# Patient Record
Sex: MALE | Race: WHITE | NOT HISPANIC OR LATINO
[De-identification: names, ages, dates, MRNs, and addresses within clinical notes are randomized per-mention and may not be internally consistent; named-entity substitution may affect disease eponyms.]

---

## 2019-10-01 ENCOUNTER — APPOINTMENT (OUTPATIENT)
Dept: ORTHOPEDIC SURGERY | Facility: CLINIC | Age: 44
End: 2019-10-01
Payer: COMMERCIAL

## 2019-10-01 VITALS
BODY MASS INDEX: 28.44 KG/M2 | HEIGHT: 72 IN | DIASTOLIC BLOOD PRESSURE: 86 MMHG | SYSTOLIC BLOOD PRESSURE: 129 MMHG | HEART RATE: 64 BPM | WEIGHT: 210 LBS

## 2019-10-01 DIAGNOSIS — S69.81XA OTHER SPECIFIED INJURIES OF RIGHT WRIST, HAND AND FINGER(S), INITIAL ENCOUNTER: ICD-10-CM

## 2019-10-01 DIAGNOSIS — Z78.9 OTHER SPECIFIED HEALTH STATUS: ICD-10-CM

## 2019-10-01 DIAGNOSIS — Z80.51 FAMILY HISTORY OF MALIGNANT NEOPLASM OF KIDNEY: ICD-10-CM

## 2019-10-01 DIAGNOSIS — Z72.3 LACK OF PHYSICAL EXERCISE: ICD-10-CM

## 2019-10-01 PROCEDURE — 99204 OFFICE O/P NEW MOD 45 MIN: CPT

## 2019-10-01 PROCEDURE — 73110 X-RAY EXAM OF WRIST: CPT | Mod: RT
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
## 2019-10-01 NOTE — PHYSICAL EXAM
[de-identified] : Constitutional: Well-nourished, well-developed, No acute distress\par Respiratory:  Good respiratory effort, no SOB\par Lymphatic: No regional lymphadenopathy, no lymphedema\par Psychiatric: Pleasant and normal affect, alert and oriented x3\par Skin: Clean dry and intact B/L UE/LE\par Musculoskeletal: normal except where as noted in regional exam\par \par \par Left Wrist:\par APPEARANCE: no marked deformities, no swelling or malalignment\par POSITIVE TENDERNESS: none\par NONTENDER: snuffbox, scapholunate, DRUJ, TFCC, FCU/FCR, ECU/ECRL/ECRB, radial/ulnar styloid, CMC, and MCP joints. \par ROM: full & painless. \par RESISTIVE TESTING: painless resisted wrist flexion/extension, and radial/ulnar deviation. \par SPECIAL TESTS: neg Finkelstein's. neg Phalen's. neg reverse Phalen's. neg Coyle's. neg rosalind test. neg TFCC grind. neg tinel's at the carpal tunnel\par Vasc: 2+ radial pulse\par Neuro: AIN, PIN, Ulnar nerve intact to motor\par Sensation: Intact to light touch throughout\par B/L Shoulders:  No asymmetry, malalignment, or swelling, Full ROM, 5/5 strength in flexion/ext, Abd/Add, IR/ER, Joints stable\par B/L Elbows:  No asymmetry, malalignment, or swelling, Full ROM, 5/5 strength in flex/ext, pronation/supination, Joints stable\par \par Right Wrist:\par APPEARANCE: No swelling, no marked deformities or malalignment\par POSITIVE TENDERNESS: TFCC, ECU\par NONTENDER: snuffbox, scapholunate, DRUJ, FCU/FCR, ECRL/ECRB, radial/ulnar styloid, CMC, and MCP joints.\par ROM: full & painless. \par RESISTIVE TESTING: Mild pain with resisted wrist extension and radial/ulnar deviation, painless resisted wrist flexion. \par SPECIAL TESTS: + TFCC grind, + shuck test, neg Finkelstein's. neg Phalen's. neg reverse Phalen's. neg Coyle's. neg tinel's at the carpal tunnel\par Vasc: 2+ radial pulse\par Neuro: AIN, PIN, Ulnar nerve intact to motor\par Sensation: Intact to light touch throughout\par \par  [de-identified] : The following radiographs were ordered and read by me during this patient's visit. I reviewed each radiograph in detail with the patient and discussed the findings as highlighted below. \par \par 3 views of the right wrist were obtained today that show no fracture, or dislocation. There are no degenerative changes seen. There is no malalignment. No obvious osseous abnormality. Otherwise unremarkable.\par \par

## 2019-10-01 NOTE — DISCUSSION/SUMMARY
[de-identified] : Discussed findings of today's exam and possible causes of patient's pain.  Educated patient on their most probable diagnosis of right TFCC sprain.  Reviewed possible courses of treatment, and we collaboratively decided best course of treatment at this time will include conservative management. Patient advised she has no evidence of tendon injury or damage on clinical exam. His pain is localized to the illness of the wrist and the TFCC. It is only reproduced with a rapid activity and weightbearing activity, no significant pain during clinical exam. We discussed various treatment options, recommend trial although the counter topical anti-inflammatory. Recommend continued watchful waiting, patient is not having limitations with ADLs, only has mild intermittent pain. May consider cortisone injection if he has persistent pain, may consider physical therapy.  Follow up as needed.  Patient appreciates and agrees with current plan.\par \par This note was generated using dragon medical dictation software.  A reasonable effort has been made for proofreading its contents, but typos may still remain.  If there are any questions or points of clarification needed please notify my office.

## 2021-10-10 ENCOUNTER — TRANSCRIPTION ENCOUNTER (OUTPATIENT)
Age: 46
End: 2021-10-10

## 2021-10-12 ENCOUNTER — TRANSCRIPTION ENCOUNTER (OUTPATIENT)
Age: 46
End: 2021-10-12

## 2021-10-19 ENCOUNTER — FORM ENCOUNTER (OUTPATIENT)
Age: 46
End: 2021-10-19

## 2021-10-20 ENCOUNTER — INPATIENT (INPATIENT)
Facility: HOSPITAL | Age: 46
LOS: 0 days | Discharge: HOME | DRG: 177 | End: 2021-10-21
Attending: HOSPITALIST | Admitting: HOSPITALIST
Payer: COMMERCIAL

## 2021-10-20 ENCOUNTER — TRANSCRIPTION ENCOUNTER (OUTPATIENT)
Age: 46
End: 2021-10-20

## 2021-10-20 VITALS
SYSTOLIC BLOOD PRESSURE: 133 MMHG | DIASTOLIC BLOOD PRESSURE: 83 MMHG | RESPIRATION RATE: 16 BRPM | TEMPERATURE: 103 F | HEART RATE: 90 BPM | OXYGEN SATURATION: 91 %

## 2021-10-20 DIAGNOSIS — U07.1 COVID-19: ICD-10-CM

## 2021-10-20 DIAGNOSIS — F17.210 NICOTINE DEPENDENCE, CIGARETTES, UNCOMPLICATED: ICD-10-CM

## 2021-10-20 DIAGNOSIS — J12.82 PNEUMONIA DUE TO CORONAVIRUS DISEASE 2019: ICD-10-CM

## 2021-10-20 LAB
ALBUMIN SERPL ELPH-MCNC: 4 G/DL — SIGNIFICANT CHANGE UP (ref 3.3–5)
ALP SERPL-CCNC: 54 U/L — SIGNIFICANT CHANGE UP (ref 40–120)
ALT FLD-CCNC: 27 U/L — SIGNIFICANT CHANGE UP (ref 12–78)
ANION GAP SERPL CALC-SCNC: 5 MMOL/L — SIGNIFICANT CHANGE UP (ref 5–17)
AST SERPL-CCNC: 19 U/L — SIGNIFICANT CHANGE UP (ref 15–37)
BASOPHILS # BLD AUTO: 0.02 K/UL — SIGNIFICANT CHANGE UP (ref 0–0.2)
BASOPHILS NFR BLD AUTO: 0.3 % — SIGNIFICANT CHANGE UP (ref 0–2)
BILIRUB SERPL-MCNC: 0.6 MG/DL — SIGNIFICANT CHANGE UP (ref 0.2–1.2)
BUN SERPL-MCNC: 13 MG/DL — SIGNIFICANT CHANGE UP (ref 7–23)
CALCIUM SERPL-MCNC: 9.6 MG/DL — SIGNIFICANT CHANGE UP (ref 8.5–10.1)
CHLORIDE SERPL-SCNC: 104 MMOL/L — SIGNIFICANT CHANGE UP (ref 96–108)
CO2 SERPL-SCNC: 27 MMOL/L — SIGNIFICANT CHANGE UP (ref 22–31)
CREAT SERPL-MCNC: 1.17 MG/DL — SIGNIFICANT CHANGE UP (ref 0.5–1.3)
D DIMER BLD IA.RAPID-MCNC: <150 NG/ML DDU — SIGNIFICANT CHANGE UP
EOSINOPHIL # BLD AUTO: 0.01 K/UL — SIGNIFICANT CHANGE UP (ref 0–0.5)
EOSINOPHIL NFR BLD AUTO: 0.2 % — SIGNIFICANT CHANGE UP (ref 0–6)
FERRITIN SERPL-MCNC: 635 NG/ML — HIGH (ref 30–400)
GLUCOSE SERPL-MCNC: 93 MG/DL — SIGNIFICANT CHANGE UP (ref 70–99)
HCT VFR BLD CALC: 46.1 % — SIGNIFICANT CHANGE UP (ref 39–50)
HGB BLD-MCNC: 15.2 G/DL — SIGNIFICANT CHANGE UP (ref 13–17)
IMM GRANULOCYTES NFR BLD AUTO: 0.5 % — SIGNIFICANT CHANGE UP (ref 0–1.5)
LYMPHOCYTES # BLD AUTO: 0.84 K/UL — LOW (ref 1–3.3)
LYMPHOCYTES # BLD AUTO: 12.6 % — LOW (ref 13–44)
MCHC RBC-ENTMCNC: 28.7 PG — SIGNIFICANT CHANGE UP (ref 27–34)
MCHC RBC-ENTMCNC: 33 GM/DL — SIGNIFICANT CHANGE UP (ref 32–36)
MCV RBC AUTO: 87.1 FL — SIGNIFICANT CHANGE UP (ref 80–100)
MONOCYTES # BLD AUTO: 0.63 K/UL — SIGNIFICANT CHANGE UP (ref 0–0.9)
MONOCYTES NFR BLD AUTO: 9.5 % — SIGNIFICANT CHANGE UP (ref 2–14)
NEUTROPHILS # BLD AUTO: 5.12 K/UL — SIGNIFICANT CHANGE UP (ref 1.8–7.4)
NEUTROPHILS NFR BLD AUTO: 76.9 % — SIGNIFICANT CHANGE UP (ref 43–77)
PLATELET # BLD AUTO: 203 K/UL — SIGNIFICANT CHANGE UP (ref 150–400)
POTASSIUM SERPL-MCNC: 4.2 MMOL/L — SIGNIFICANT CHANGE UP (ref 3.5–5.3)
POTASSIUM SERPL-SCNC: 4.2 MMOL/L — SIGNIFICANT CHANGE UP (ref 3.5–5.3)
PROCALCITONIN SERPL-MCNC: 0.05 NG/ML — SIGNIFICANT CHANGE UP (ref 0.02–0.1)
PROT SERPL-MCNC: 7.8 GM/DL — SIGNIFICANT CHANGE UP (ref 6–8.3)
RBC # BLD: 5.29 M/UL — SIGNIFICANT CHANGE UP (ref 4.2–5.8)
RBC # FLD: 13 % — SIGNIFICANT CHANGE UP (ref 10.3–14.5)
SODIUM SERPL-SCNC: 136 MMOL/L — SIGNIFICANT CHANGE UP (ref 135–145)
WBC # BLD: 6.65 K/UL — SIGNIFICANT CHANGE UP (ref 3.8–10.5)
WBC # FLD AUTO: 6.65 K/UL — SIGNIFICANT CHANGE UP (ref 3.8–10.5)

## 2021-10-20 PROCEDURE — 99285 EMERGENCY DEPT VISIT HI MDM: CPT

## 2021-10-20 PROCEDURE — C9399: CPT

## 2021-10-20 PROCEDURE — 71045 X-RAY EXAM CHEST 1 VIEW: CPT | Mod: 26

## 2021-10-20 PROCEDURE — 82565 ASSAY OF CREATININE: CPT

## 2021-10-20 PROCEDURE — 83036 HEMOGLOBIN GLYCOSYLATED A1C: CPT

## 2021-10-20 PROCEDURE — 93010 ELECTROCARDIOGRAM REPORT: CPT

## 2021-10-20 PROCEDURE — 80076 HEPATIC FUNCTION PANEL: CPT

## 2021-10-20 PROCEDURE — 36415 COLL VENOUS BLD VENIPUNCTURE: CPT

## 2021-10-20 PROCEDURE — 86769 SARS-COV-2 COVID-19 ANTIBODY: CPT

## 2021-10-20 PROCEDURE — 99223 1ST HOSP IP/OBS HIGH 75: CPT

## 2021-10-20 RX ORDER — ACETAMINOPHEN 500 MG
1000 TABLET ORAL ONCE
Refills: 0 | Status: COMPLETED | OUTPATIENT
Start: 2021-10-20 | End: 2021-10-20

## 2021-10-20 RX ORDER — REMDESIVIR 5 MG/ML
200 INJECTION INTRAVENOUS EVERY 24 HOURS
Refills: 0 | Status: COMPLETED | OUTPATIENT
Start: 2021-10-20 | End: 2021-10-20

## 2021-10-20 RX ORDER — ENOXAPARIN SODIUM 100 MG/ML
40 INJECTION SUBCUTANEOUS EVERY 12 HOURS
Refills: 0 | Status: DISCONTINUED | OUTPATIENT
Start: 2021-10-20 | End: 2021-10-21

## 2021-10-20 RX ORDER — ALBUTEROL 90 UG/1
2 AEROSOL, METERED ORAL EVERY 4 HOURS
Refills: 0 | Status: DISCONTINUED | OUTPATIENT
Start: 2021-10-20 | End: 2021-10-21

## 2021-10-20 RX ORDER — DEXAMETHASONE 0.5 MG/5ML
6 ELIXIR ORAL ONCE
Refills: 0 | Status: COMPLETED | OUTPATIENT
Start: 2021-10-20 | End: 2021-10-20

## 2021-10-20 RX ORDER — INFLUENZA VIRUS VACCINE 15; 15; 15; 15 UG/.5ML; UG/.5ML; UG/.5ML; UG/.5ML
0.5 SUSPENSION INTRAMUSCULAR ONCE
Refills: 0 | Status: DISCONTINUED | OUTPATIENT
Start: 2021-10-20 | End: 2021-10-21

## 2021-10-20 RX ORDER — ACETAMINOPHEN 500 MG
650 TABLET ORAL EVERY 4 HOURS
Refills: 0 | Status: DISCONTINUED | OUTPATIENT
Start: 2021-10-20 | End: 2021-10-21

## 2021-10-20 RX ORDER — REMDESIVIR 5 MG/ML
INJECTION INTRAVENOUS
Refills: 0 | Status: DISCONTINUED | OUTPATIENT
Start: 2021-10-20 | End: 2021-10-21

## 2021-10-20 RX ORDER — BNT162B2 0.23 MG/2.25ML
0.3 INJECTION, SUSPENSION INTRAMUSCULAR
Qty: 0 | Refills: 0 | DISCHARGE

## 2021-10-20 RX ORDER — ENOXAPARIN SODIUM 100 MG/ML
100 INJECTION SUBCUTANEOUS EVERY 12 HOURS
Refills: 0 | Status: DISCONTINUED | OUTPATIENT
Start: 2021-10-20 | End: 2021-10-20

## 2021-10-20 RX ORDER — SODIUM CHLORIDE 9 MG/ML
250 INJECTION INTRAMUSCULAR; INTRAVENOUS; SUBCUTANEOUS
Refills: 0 | Status: COMPLETED | OUTPATIENT
Start: 2021-10-20 | End: 2021-10-20

## 2021-10-20 RX ORDER — DEXAMETHASONE 0.5 MG/5ML
6 ELIXIR ORAL DAILY
Refills: 0 | Status: DISCONTINUED | OUTPATIENT
Start: 2021-10-21 | End: 2021-10-21

## 2021-10-20 RX ORDER — REMDESIVIR 5 MG/ML
100 INJECTION INTRAVENOUS EVERY 24 HOURS
Refills: 0 | Status: DISCONTINUED | OUTPATIENT
Start: 2021-10-21 | End: 2021-10-21

## 2021-10-20 RX ADMIN — Medication 6 MILLIGRAM(S): at 14:07

## 2021-10-20 RX ADMIN — ENOXAPARIN SODIUM 40 MILLIGRAM(S): 100 INJECTION SUBCUTANEOUS at 21:10

## 2021-10-20 RX ADMIN — REMDESIVIR 500 MILLIGRAM(S): 5 INJECTION INTRAVENOUS at 21:10

## 2021-10-20 RX ADMIN — Medication 1000 MILLIGRAM(S): at 14:07

## 2021-10-20 NOTE — ED PROVIDER NOTE - ATTENDING CONTRIBUTION TO CARE
I, Harman Barraza MD, personally saw the patient with the PA, and completed the key components of the history and physical exam. I then discussed the management plan with the PA.

## 2021-10-20 NOTE — ED ADULT NURSE REASSESSMENT NOTE - NS ED NURSE REASSESS COMMENT FT1
Vital signs obtained pre monoclonal anitbody infusion. O2 sat 91% and unable to receive monoclonal antibody infusion at this time due to hypoxia. Temperature oral 102.5. HERON Yao is aware of O2 sat 91% RA and elevated temperature of 102.5. IV placed in right hand 20 G, Pt tolerated well, positive blood return, flushed with 0.9% normal saline. Pt being transferred in hospital to be evaluated for hypoxia and covid 19 detected.
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
## 2021-10-20 NOTE — ED PROVIDER NOTE - PROGRESS NOTE DETAILS
44 yo male smoker presents with fever, myalgia, cough. Pt states was taking tylenol and nsaids which did not help with the fever and had a +covid test 8 days ago. Pt was scheduled for MAB and was added to the schedule. At rest, O2 sat was 93% and on exertion with pt marching in place and walking around the room dropped down to 90% and disqualified him from MAB infusion and sent to the main ER for evaluation. -Ricardo Hoff PA-C

## 2021-10-20 NOTE — ED PROVIDER NOTE - CONSTITUTIONAL, MLM
White male adult, alert, no sentence shortening, no respiratory distress, mildly ill, non toxic. normal...

## 2021-10-20 NOTE — ED PROVIDER NOTE - OBJECTIVE STATEMENT
46 y/o male with no pertinent PMHx, ambulatory to the ED with active COVID sx with generalized weakness, fatigue, slight dry cough, diffuse myalgias, mild decrease appetite, decrease smell sensation. Fever up to 102F, poorly responsive to oral antipyretic. Sx started last week due to wife being COVID + and then their 10 y/o was + and then pt was found to be COVID +. Pt came to the ED for MAB, but was found to be hypoxic in the low 90s. +Pfizer vaccinated x2, 2nd in April. Pt denies SOB, HA, rash, CP. No current PCP. NKDA. 44 y/o male with no pertinent PMHx, ambulatory to the ED with active COVID sx with generalized weakness, fatigue, slight dry cough, diffuse myalgias, mild decrease appetite, decrease smell sensation. Fever up to 102F, poorly responsive to oral antipyretic. Sx started last week due to wife being COVID + and then their child was + and then pt was found to be COVID +. Pt came to the ED for MAB, but was found to be hypoxic in the low 90s. +Pfizer vaccinated x2, 2nd in April. Pt denies SOB, HA, rash, CP. No current PCP. NKDA.

## 2021-10-20 NOTE — H&P ADULT - HISTORY OF PRESENT ILLNESS
46 yo M with no pmh presents for MAB after he was found to be covid positive. Patient endorses several days of generalized weakness, sob, dry cough, fatigue, myalgias and decreased sense of smell. Endorses fevers to 102 that were not defervescing with meds.  Pt endorses he is double Pfizer vaccinated. Wife and son were found to be positive prior to his symptoms.   In ED he was found to hypoxic to 90% on RA. CXR with L perihilar infiltrates. Dimer negative    Given decdaron and tylenol    PAST MEDICAL/SURGICAL/FAMILY/SOCIAL HISTORY:    Past Medical, Past Surgical, and Family History:  PAST MEDICAL HISTORY:  No pertinent past medical history.     Tobacco Usage:  · Tobacco Usage	Current some day smoker  Shx/Fhx: none 44 yo M with no pmh presents for MAB after he was found to be covid positive on 10/12. Patient endorses several days of generalized weakness, sob, dry cough, fatigue, myalgias and decreased sense of smell. Endorses fevers to 102 that were not defervescing with meds.  Pt endorses he is double Pfizer vaccinated. Wife and son were found to be positive prior to his symptoms.   In ED he was found to hypoxic to 90% on RA. CXR with L perihilar infiltrates. Dimer negative    Given decdaron and tylenol    PAST MEDICAL/SURGICAL/FAMILY/SOCIAL HISTORY:    Past Medical, Past Surgical, and Family History:  PAST MEDICAL HISTORY:  No pertinent past medical history.     Tobacco Usage:  · Tobacco Usage	Current some day smoker  Shx/Fhx: none

## 2021-10-20 NOTE — ED ADULT NURSE REASSESSMENT NOTE - NS ED NURSE REASSESS COMMENT FT1
Purposeful proactive rounding completed at this time, all needs addressed, no change from previous assessment. Dinner ordered. Denies pain at present, patient updated on admission status and plan of care. All questions addressed and answered. Comfort and safety measures maintained. Will continue to monitor.

## 2021-10-20 NOTE — ED ADULT NURSE NOTE - OBJECTIVE STATEMENT
Pt present to the ER for scheduled MAB. Pt was unable to receive that medication due to O2 dropping to 90%. Pt brought to the main ER. Pt tested positive COVID on Tuesday. Pt did not have any symptoms at first then developed fevers. Pt stated that he was unable to control his fever with Tylenol.  Pt c/o general malaise, body aches, and mild cough. Pt denies any SOB, chest pain, or N/V.

## 2021-10-20 NOTE — ED PROVIDER NOTE - CARE PLAN
Principal Discharge DX:	Pneumonia due to COVID-19 virus   1 Principal Discharge DX:	Pneumonia due to COVID-19 virus  Secondary Diagnosis:	Hypoxia

## 2021-10-20 NOTE — ED PROVIDER NOTE - CLINICAL SUMMARY MEDICAL DECISION MAKING FREE TEXT BOX
44 y/o male with no pertinent PMHx, ambulatory to the ED with active COVID sx with generalized weakness, fatigue, slight dry cough, diffuse myalgias, mild decrease appetite, decrease smell sensation. Pt initially slotted to received MAB, but determined to be hypoxic and sent to main ED for further eval and management. Plan; CXR, EKG, pulse ox before and after ambulation trial, r/o exertional desaturation, labs including covid inflammatory markers, iv decadron, Tylenol po for fever. 44 y/o male with no pertinent PMHx, ambulatory to the ED with active COVID sx with generalized weakness, fatigue, slight dry cough, diffuse myalgias, mild decrease appetite, decrease smell sensation. Pt initially slotted to received MAB, but determined to be hypoxic and sent to main ED for further eval and management. Plan; CXR, EKG, pulse ox before and after ambulation trial, r/o exertional desaturation, labs including covid inflammatory markers, iv decadron, Tylenol po for fever, monitor, observe, and reassess. 44 y/o male with no pertinent PMHx, ambulatory to the ED with active COVID sx with generalized weakness, fatigue, slight dry cough, diffuse myalgias, mild decrease appetite, decrease smell sensation. Pt initially slotted to receive MAB, but determined to be hypoxic and sent to main ED for further eval and management. Plan; CXR, EKG, pulse ox before and after ambulation trial, r/o exertional desaturation, labs including covid inflammatory markers, iv decadron, Tylenol po for fever, monitor, observe, and reassess. 44 y/o male with no pertinent PMHx, ambulatory to the ED with active COVID sx with generalized weakness, fatigue, slight dry cough, diffuse myalgias, mild decrease appetite, decrease smell sensation. Pt initially slotted to receive MAB, but determined to be hypoxic and sent to main ED for further eval and management.   Plan: CXR, EKG, pulse ox before and after ambulation trial, r/o exertional desaturation, labs including covid inflammatory markers, iv decadron, Tylenol po for fever, monitor, observe, and reassess.

## 2021-10-20 NOTE — ED PROVIDER NOTE - ENMT, MLM
Airway patent, oral pharynx clear. Mouth with slightly dry mucosa. Throat has no vesicles, no oropharyngeal exudates and uvula is midline.

## 2021-10-20 NOTE — ED ADULT NURSE REASSESSMENT NOTE - NS ED NURSE REASSESS COMMENT FT1
Assumed care of patient from FERCHO Perez, at 16:30. Patient AxOx4, in no acute distress. VS stable. O2 saturation 94% on RA. Patient up and out of bed, tolerating well. No SOB observed. Comfort and safety measures maintained, side rails up. All needs addressed. Updated on plan of care, all questions and concerns addressed. Will continue to monitor.

## 2021-10-20 NOTE — H&P ADULT - NSHPLABSRESULTS_GEN_ALL_CORE
LABS: All Labs Reviewed:                        15.2   6.65  )-----------( 203      ( 20 Oct 2021 14:01 )             46.1     10-20    136  |  104  |  13  ----------------------------<  93  4.2   |  27  |  1.17    Ca    9.6      20 Oct 2021 14:01    TPro  7.8  /  Alb  4.0  /  TBili  0.6  /  DBili  x   /  AST  19  /  ALT  27  /  AlkPhos  54  10-20              Blood Culture:           EKG/inflammatory labs pending    CXR reviewed

## 2021-10-20 NOTE — H&P ADULT - NSHPPHYSICALEXAM_GEN_ALL_CORE
ICU Vital Signs Last 24 Hrs  T(C): 37.1 (20 Oct 2021 16:12), Max: 39.2 (20 Oct 2021 13:20)  T(F): 98.8 (20 Oct 2021 16:12), Max: 102.5 (20 Oct 2021 13:20)  HR: 97 (20 Oct 2021 16:12) (90 - 97)  BP: 138/86 (20 Oct 2021 16:12) (130/96 - 138/86)  BP(mean): --  ABP: --  ABP(mean): --  RR: 18 (20 Oct 2021 16:12) (16 - 19)  SpO2: 97% (20 Oct 2021 16:12) (91% - 97%)      PHYSICAL EXAM:    Constitutional: NAD, awake and alert,   HEENT: PERR, EOMI, Normal Hearing, MMM  Neck: Soft and supple, No LAD, No JVD  Respiratory: Breath sounds are clear bilaterally, No wheezing, rales or rhonchi  Cardiovascular: S1 and S2, regular rate and rhythm, no Murmurs, gallops or rubs  Gastrointestinal: Bowel Sounds present, soft, nontender, nondistended, no guarding, no rebound  Extremities: No peripheral edema  Vascular: 2+ peripheral pulses  Neurological: A/O x 3, no focal deficits  Musculoskeletal: 5/5 strength b/l upper and lower extremities  Skin: No rashes

## 2021-10-20 NOTE — H&P ADULT - NSHPREVIEWOFSYSTEMS_GEN_ALL_CORE
REVIEW OF SYSTEMS:    CONSTITUTIONAL: +weakness and fevers  EYES/ENT: No visual changes;  No vertigo or throat pain   NECK: No pain or stiffness  RESPIRATORY: +cough and sob  CARDIOVASCULAR: No chest pain or palpitations  GASTROINTESTINAL: No abdominal or epigastric pain. No nausea, vomiting, or hematemesis; No diarrhea or constipation. No melena or hematochezia.  GENITOURINARY: No dysuria, frequency or hematuria  NEUROLOGICAL: No numbness or weakness  SKIN: No itching, burning, rashes, or lesions   All other review of systems is negative unless indicated above

## 2021-10-21 ENCOUNTER — TRANSCRIPTION ENCOUNTER (OUTPATIENT)
Age: 46
End: 2021-10-21

## 2021-10-21 VITALS
OXYGEN SATURATION: 95 % | TEMPERATURE: 98 F | RESPIRATION RATE: 18 BRPM | DIASTOLIC BLOOD PRESSURE: 94 MMHG | HEART RATE: 70 BPM | SYSTOLIC BLOOD PRESSURE: 133 MMHG

## 2021-10-21 LAB
A1C WITH ESTIMATED AVERAGE GLUCOSE RESULT: 5.2 % — SIGNIFICANT CHANGE UP (ref 4–5.6)
COVID-19 SPIKE DOMAIN AB INTERP: POSITIVE
COVID-19 SPIKE DOMAIN ANTIBODY RESULT: >250 U/ML — HIGH
CRP SERPL-MCNC: 45 MG/L — HIGH
ESTIMATED AVERAGE GLUCOSE: 103 MG/DL — SIGNIFICANT CHANGE UP (ref 68–114)
SARS-COV-2 IGG+IGM SERPL QL IA: >250 U/ML — HIGH
SARS-COV-2 IGG+IGM SERPL QL IA: POSITIVE

## 2021-10-21 PROCEDURE — 99239 HOSP IP/OBS DSCHRG MGMT >30: CPT

## 2021-10-21 RX ORDER — DEXAMETHASONE 0.5 MG/5ML
1 ELIXIR ORAL
Qty: 3 | Refills: 0
Start: 2021-10-21 | End: 2021-10-23

## 2021-10-21 RX ORDER — ALBUTEROL 90 UG/1
2 AEROSOL, METERED ORAL
Qty: 1 | Refills: 0
Start: 2021-10-21

## 2021-10-21 RX ADMIN — Medication 6 MILLIGRAM(S): at 08:49

## 2021-10-21 RX ADMIN — ENOXAPARIN SODIUM 40 MILLIGRAM(S): 100 INJECTION SUBCUTANEOUS at 08:50

## 2021-10-21 NOTE — CONSULT NOTE ADULT - ASSESSMENT
44 yo M with no pmh presents for MAB on 10/20 after he was found to be covid positive on 10/12. Patient endorses several days of generalized weakness, sob, dry cough, fatigue, myalgias and decreased sense of smell. Endorses fevers to 102 that were not defervescing with meds.  Pt endorses he is double Pfizer vaccinated. Wife and son were found to be positive prior to his symptoms. He did have hypoxia to 90%, upon admission, however did not use supplemental oxygen, was started on remedesivir and steroids, he had sweats and then since admission has been afebrile. No other specific complaints, would like to go home.     1. Patient admitted with COVID-19 infection superimposed on viral pneumonia  - follow up cultures   - serial cbc and monitor temperature   - reviewed prior medical records to evaluate for resistant or atypical pathogens   - patient is not requiring supplemental oxygen, therefore will stop remdesivir  - consideration to discharge patient home  - will need to quarantine for 10 days after last fever  - no need for antibiotics, viral pneumonia  - consideration to stop decadron, as well  2. other issues: per medicine

## 2021-10-21 NOTE — DISCHARGE NOTE PROVIDER - NSDCCPCAREPLAN_GEN_ALL_CORE_FT
PRINCIPAL DISCHARGE DIAGNOSIS  Diagnosis: Pneumonia due to COVID-19 virus  Assessment and Plan of Treatment: No Hypoxia present on admission.   Respiratory Status stable

## 2021-10-21 NOTE — DISCHARGE NOTE PROVIDER - HOSPITAL COURSE
"FROM H&P    "HPI:  44 yo M with no pmh presents for MAB on 10/20 after he was found to be covid positive on 10/12. Patient endorses several days of generalized weakness, sob, dry cough, fatigue, myalgias and decreased sense of smell. Endorses fevers to 102 that were not defervescing with meds.  Pt endorses he is double Pfizer vaccinated. Wife and son were found to be positive prior to his symptoms. He did have hypoxia to 90%, upon admission, however did not use supplemental oxygen, was started on remedesivir and steroids, he had sweats and then since admission has been afebrile. No other specific complaints, would like to go home. "    Patient was adminsitered remdesevir and decadron x 1. Unclear if the 90% in the ED was accurate in that patient never required oxygen and always above 94% thereafter. I ambulated patient evening of 10/21/2021 for 7 minutes on RA while having full conversation while walking and talking and SpO2 low was 97%. Agree with disconiutation of remdesevir by ID. Discharge home in stable condition to continue quarantine. Dimer negative.    PHYSICAL EXAM:    T(C): 36.6 (10-21-21 @ 16:02), Max: 37 (10-20-21 @ 18:17)  HR: 70 (10-21-21 @ 16:02) (69 - 90)  BP: 133/94 (10-21-21 @ 16:02) (130/90 - 139/90)  RR: 18 (10-21-21 @ 16:02) (18 - 18)  SpO2: 95% (10-21-21 @ 16:02) (94% - 99%)    General: AAOx3; NAD  Head: AT/NC  ENT: Moist Mucous Membranes; No Injury  Neck: Non-tender; No JVD  CVS: RRR, S1&S2, No murmur, No edema  Respiratory: Lungs CTA B/L; Normal Respiratory Effort  Abdomen/GI: Soft, non-tender, non-distended, no guarding, no rebound, normal bowel sounds  : No bladder distention, No Vanegas  Extremites: No cyanosis, No clubbing, No edema  MSK: No CVA tenderness, Normal ROM, No injury  Neuro: AAOx3, CNII-XII grossly intact, non-focal  Psych: Appropriate, Cooperative, No depression, No anxiety  Skin: Clean, Dry and Intact  Discharge Management: 39 minutes  Date of Discharge/Service: 10/21/2021                        15.2   6.65  )-----------( 203      ( 20 Oct 2021 14:01 )             46.1     10-21    x   |  x   |  x   ----------------------------<  x   x    |  x   |  1.13    Ca    9.6      20 Oct 2021 14:01    TPro  7.6  /  Alb  3.6  /  TBili  0.4  /  DBili  <0.1  /  AST  13<L>  /  ALT  25  /  AlkPhos  51  10-21      CAPILLARY BLOOD GLUCOSE        < from: Xray Chest 1 View-PORTABLE IMMEDIATE (10.20.21 @ 14:14) >    IMPRESSION: Left perihilar infiltrate.    < end of copied text >

## 2021-10-21 NOTE — DISCHARGE NOTE PROVIDER - NSDCMRMEDTOKEN_GEN_ALL_CORE_FT
albuterol 90 mcg/inh inhalation aerosol: 2 puff(s) inhaled every 4 hours, As needed, Shortness of Breath and/or Wheezing  benzonatate 100 mg oral capsule: 1 cap(s) orally 3 times a day, As needed, Cough  dexamethasone 6 mg oral tablet: 1 tab(s) orally once a day   Pfizer-BioNTech COVID-19 Vaccine PF 30 mcg/0.3 mL intramuscular suspension: 0.3 milliliter(s) intramuscular once  ***Recieved 2nd dose in April***

## 2021-10-21 NOTE — DISCHARGE NOTE NURSING/CASE MANAGEMENT/SOCIAL WORK - PATIENT PORTAL LINK FT
You can access the FollowMyHealth Patient Portal offered by [facility] by registering at the following website: [email or web address]. By joining [facility]’s FollowMyHealth portal, you will also be able to view your health information using other applications (apps) compatible with our system.

## 2021-10-21 NOTE — CONSULT NOTE ADULT - SUBJECTIVE AND OBJECTIVE BOX
Patient is a 45y old  Male who presents with a chief complaint of covid (20 Oct 2021 16:33)    HPI:  44 yo M with no pmh presents for MAB on 10/20 after he was found to be covid positive on 10/12. Patient endorses several days of generalized weakness, sob, dry cough, fatigue, myalgias and decreased sense of smell. Endorses fevers to 102 that were not defervescing with meds.  Pt endorses he is double Pfizer vaccinated. Wife and son were found to be positive prior to his symptoms. He did have hypoxia to 90%, upon admission, however did not use supplemental oxygen, was started on remedesivir and steroids, he had sweats and then since admission has been afebrile. No other specific complaints, would like to go home.         PMH: as above  PSH: as above  Meds: per reconciliation sheet, noted below  MEDICATIONS  (STANDING):  dexAMETHasone  Injectable 6 milliGRAM(s) IV Push daily  enoxaparin Injectable 40 milliGRAM(s) SubCutaneous every 12 hours  influenza   Vaccine 0.5 milliLiter(s) IntraMuscular once    MEDICATIONS  (PRN):  acetaminophen   Tablet .. 650 milliGRAM(s) Oral every 4 hours PRN Temp greater or equal to 38.5C (101.3F)  ALBUTerol    90 MICROgram(s) HFA Inhaler 2 Puff(s) Inhalation every 4 hours PRN Shortness of Breath and/or Wheezing  benzonatate 100 milliGRAM(s) Oral three times a day PRN Cough    Allergies    No Known Allergies    Intolerances      Social: no smoking, no alcohol, no illegal drugs; no recent travel, no exposure to TB  FAMILY HISTORY:     no history of premature cardiovascular disease in first degree relatives  ROS: the patient has no chills, no HA, no dizziness, no sore throat, no blurry vision, no CP, no palpitations, no SOB, no cough, no abdominal pain, no diarrhea, no N/V, no dysuria, no leg pain, no claudication, no rash, no joint aches, no rectal pain or bleeding, no night sweats  All other systems reviewed and are negative    Vital Signs Last 24 Hrs  T(C): 36.5 (21 Oct 2021 08:49), Max: 39.2 (20 Oct 2021 13:20)  T(F): 97.7 (21 Oct 2021 08:49), Max: 102.5 (20 Oct 2021 13:20)  HR: 69 (21 Oct 2021 08:49) (69 - 97)  BP: 133/90 (21 Oct 2021 08:49) (130/90 - 139/90)  BP(mean): --  RR: 18 (21 Oct 2021 08:49) (16 - 19)  SpO2: 94% (21 Oct 2021 08:49) (91% - 99%)  Daily Height in cm: 182.88 (20 Oct 2021 16:21)    Daily     PE:    Constitutional: frail looking  HEENT: NC/AT, EOMI, PERRLA, conjunctivae clear; ears and nose atraumatic; pharynx clear  Neck: supple; thyroid not palpable  Back: no tenderness  Respiratory: respiratory effort normal; clear to auscultation  Cardiovascular: S1S2 regular, no murmurs  Abdomen: soft, not tender, not distended, positive BS; no liver or spleen organomegaly  Genitourinary: no suprapubic tenderness  Musculoskeletal: no muscle tenderness, no joint swelling or tenderness  Neurological/ Psychiatric: AxOx3, judgement and insight normal;  moving all extremities  Skin: no rashes; no palpable lesions    Labs: all available labs reviewed                        15.2   6.65  )-----------( 203      ( 20 Oct 2021 14:01 )             46.1     10-21    x   |  x   |  x   ----------------------------<  x   x    |  x   |  1.13    Ca    9.6      20 Oct 2021 14:01    TPro  7.6  /  Alb  3.6  /  TBili  0.4  /  DBili  <0.1  /  AST  13<L>  /  ALT  25  /  AlkPhos  51  10-21     LIVER FUNCTIONS - ( 21 Oct 2021 07:56 )  Alb: 3.6 g/dL / Pro: 7.6 gm/dL / ALK PHOS: 51 U/L / ALT: 25 U/L / AST: 13 U/L / GGT: x             < from: Xray Chest 1 View-PORTABLE IMMEDIATE (10.20.21 @ 14:14) >  IMPRESSION: Left perihilar infiltrate.    < end of copied text >      Radiology: all available radiological tests reviewed    Advanced directives addressed: full resuscitation

## 2023-03-31 PROBLEM — Z78.9 OTHER SPECIFIED HEALTH STATUS: Chronic | Status: ACTIVE | Noted: 2021-10-23

## 2023-07-03 ENCOUNTER — APPOINTMENT (OUTPATIENT)
Dept: ORTHOPEDIC SURGERY | Facility: CLINIC | Age: 48
End: 2023-07-03
Payer: COMMERCIAL

## 2023-07-03 VITALS
SYSTOLIC BLOOD PRESSURE: 145 MMHG | WEIGHT: 225 LBS | DIASTOLIC BLOOD PRESSURE: 89 MMHG | HEIGHT: 72 IN | TEMPERATURE: 98.1 F | HEART RATE: 84 BPM | BODY MASS INDEX: 30.48 KG/M2

## 2023-07-03 DIAGNOSIS — M25.551 PAIN IN RIGHT HIP: ICD-10-CM

## 2023-07-03 PROCEDURE — 99204 OFFICE O/P NEW MOD 45 MIN: CPT

## 2023-07-03 PROCEDURE — 73522 X-RAY EXAM HIPS BI 3-4 VIEWS: CPT

## 2023-07-03 NOTE — PHYSICAL EXAM
[de-identified] : GENERAL APPEARANCE: Well nourished and hydrated, pleasant, alert, and oriented x 3. Appears their stated age. \par HEENT: Normocephalic, extraocular eye motion intact. Nasal septum midline. Oral cavity clear. External auditory canal clear. \par RESPIRATORY: Breath sounds clear and audible in all lobes. No wheezing, No accessory muscle use.\par CARDIOVASCULAR: No apparent abnormalities. No lower leg edema. No varicosities. Pedal pulses are palpable.\par NEUROLOGIC: Sensation is normal, no muscle weakness in the upper or lower extremities.\par DERMATOLOGIC: No apparent skin lesions, moist, warm, no rash.\par SPINE: Cervical spine appears normal and moves freely; thoracic spine appears normal and moves freely; lumbosacral spine appears normal and moves freely, normal, nontender.\par MUSCULOSKELETAL: Hands, wrists, and elbows are normal and move freely, shoulders are normal and move freely.  [de-identified] : Right hip exam shows +stinchfield meanuver, no pain with ROM, tenderness over the greater trochanter.  [de-identified] : 3V xray of the right hip done in the office today and reviewed by Dr. Riley Colón demonstrates minimal right hip osteoarthritis

## 2023-07-03 NOTE — HISTORY OF PRESENT ILLNESS
[Stable] : stable [3] : a current pain level of 3/10 [0] : a minimum pain level of 0/10 [5] : a maximum pain level of 5/10 [Walking] : worsened by walking [Rest] : relieved by rest [de-identified] : 48 y/o M pt presents with right hip pain. The pt states his pain started a months ago. The pt states when getting inside the car, he felt he pulled a muscle which caused him to have pain. He states his pain is localized on the greater trochanter. He denies any groin pain. He feels shooting pain. He feels better with rest. He denies seeing another doctor for his pain. He has a hx of scoliosis.

## 2023-07-03 NOTE — DISCUSSION/SUMMARY
[de-identified] : 46 y/o M pt presents with minimal right hip osteoarthritis. The nature of his condition and treatment options were discussed in detail. The pt is not a candidate for a right ESTHER. We recommend an MRI of the right hip to better understand the etiology of his pain. The pt agreed. We offered a right hip bursa injection which he deferred. He should continue to do low impact exercises. We recommend OTC antiinflammatories if needed. He will f/u when the MRI is ready.

## 2023-09-19 ENCOUNTER — APPOINTMENT (OUTPATIENT)
Dept: ORTHOPEDIC SURGERY | Facility: CLINIC | Age: 48
End: 2023-09-19
Payer: COMMERCIAL

## 2023-09-19 VITALS — WEIGHT: 225 LBS | BODY MASS INDEX: 30.48 KG/M2 | HEIGHT: 72 IN

## 2023-09-19 DIAGNOSIS — M84.374A STRESS FRACTURE, RIGHT FOOT, INITIAL ENCOUNTER FOR FRACTURE: ICD-10-CM

## 2023-09-19 PROCEDURE — 99203 OFFICE O/P NEW LOW 30 MIN: CPT | Mod: 25

## 2023-09-19 PROCEDURE — L4361: CPT | Mod: RT

## 2023-09-19 PROCEDURE — 73630 X-RAY EXAM OF FOOT: CPT | Mod: RT

## 2023-10-16 ENCOUNTER — NON-APPOINTMENT (OUTPATIENT)
Age: 48
End: 2023-10-16

## 2024-02-15 ENCOUNTER — APPOINTMENT (OUTPATIENT)
Dept: ORTHOPEDIC SURGERY | Facility: CLINIC | Age: 49
End: 2024-02-15

## 2024-02-15 ENCOUNTER — APPOINTMENT (OUTPATIENT)
Dept: ORTHOPEDIC SURGERY | Facility: CLINIC | Age: 49
End: 2024-02-15
Payer: COMMERCIAL

## 2024-02-15 DIAGNOSIS — Z00.00 ENCOUNTER FOR GENERAL ADULT MEDICAL EXAMINATION W/OUT ABNORMAL FINDINGS: ICD-10-CM

## 2024-02-15 DIAGNOSIS — M25.672 STIFFNESS OF LEFT ANKLE, NOT ELSEWHERE CLASSIFIED: ICD-10-CM

## 2024-02-15 DIAGNOSIS — R29.898 OTHER SYMPTOMS AND SIGNS INVOLVING THE MUSCULOSKELETAL SYSTEM: ICD-10-CM

## 2024-02-15 DIAGNOSIS — M77.50 OTHER ENTHESOPATHY OF UNSPCFD FOOT AND ANKLE: ICD-10-CM

## 2024-02-15 PROCEDURE — 73600 X-RAY EXAM OF ANKLE: CPT | Mod: LT

## 2024-02-15 PROCEDURE — 99214 OFFICE O/P EST MOD 30 MIN: CPT

## 2024-02-15 PROCEDURE — 73630 X-RAY EXAM OF FOOT: CPT | Mod: LT

## 2024-02-15 RX ORDER — METHYLPREDNISOLONE 4 MG/1
4 TABLET ORAL
Qty: 1 | Refills: 0 | Status: ACTIVE | COMMUNITY
Start: 2024-02-15 | End: 1900-01-01

## 2024-02-15 RX ORDER — MELOXICAM 15 MG/1
15 TABLET ORAL DAILY
Qty: 30 | Refills: 2 | Status: COMPLETED | COMMUNITY
Start: 2024-02-15 | End: 2024-05-15

## 2024-02-15 NOTE — PHYSICAL EXAM
[Mild] : mild diffused ankle swelling [4___] : inversion 4[unfilled]/5 [5___] : eversion 5[unfilled]/5 [2+] : posterior tibialis pulse: 2+ [Normal] : saphenous nerve sensation normal [] : Sensation present to light touch in all distributions [Weight -] : weightbearing [Left] : left foot [There are no fractures, subluxations or dislocations. No significant abnormalities are seen] : There are no fractures, subluxations or dislocations. No significant abnormalities are seen [de-identified] : bipartite medial sesamoid [de-identified] : plantar flexion 20 degrees [de-identified] : inversion 5 degrees [de-identified] : eversion 5 degrees [[id number]] : dorsiflexion 5 degrees

## 2024-02-15 NOTE — HISTORY OF PRESENT ILLNESS
[Dull/Aching] : dull/aching [Sharp] : sharp [Shooting] : shooting [de-identified] : Patient is a 48 male presents for evaluation of his right ankle.  He reports pain started in early February, 2024.  No trauma.  He put himself in a cam boot which is helpful.  Nsaids are helpful.  He has had similar episodes in the past.  No other treatment to date. [] : no [FreeTextEntry5] : Patient states he has been experiencing on and off pain for the past few years. As of the past 2 weeks pain has worsened. Patient noticed swelling in the ankle. Pain travels into the heel. [de-identified] : x-ray

## 2024-04-10 NOTE — DISCHARGE NOTE PROVIDER - NSCORESITESY/N_GEN_A_CORE_RD
Name: FREDRICK HURT     Question 1   Daily Activities   Have you been able to perform your daily activities such as getting dressed or making your meals, as you normally would? Please press 1 if you have been able to perform daily activities. Press 2 if you have not been able to.   Not performing Daily Activities       Call Status:     Attempt 1, Voicemail Left , Inbound Call    Wrong Button Pressed         Call 2:     Daily Activities    Yes

## 2025-01-08 NOTE — DISCUSSION/SUMMARY
[de-identified] : WBAT in supportive footwear, can wean from CAM boot. Pt. recommended a course of PT. Stretching exercises demonstrated and recommended. Ice to affected area. Activity modification.  The patient was explained the options as well as benefits of over the counter verses prescription strength nonsteroidal anti-inflammatory medication. The patient opts for a prescription strength medication.  Medrol dose pack and then transition to Mobic 15 mg qday prn. 
100% of the time/able to follow multistep instructions